# Patient Record
Sex: MALE | Race: WHITE | NOT HISPANIC OR LATINO | URBAN - NONMETROPOLITAN AREA
[De-identification: names, ages, dates, MRNs, and addresses within clinical notes are randomized per-mention and may not be internally consistent; named-entity substitution may affect disease eponyms.]

---
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
## 2025-02-10 DIAGNOSIS — F40.10 SOCIAL PHOBIA: ICD-10-CM

## 2025-02-10 RX ORDER — FLUOXETINE HYDROCHLORIDE 40 MG/1
40 CAPSULE ORAL DAILY
Qty: 90 CAPSULE | Refills: 0 | Status: SHIPPED | OUTPATIENT
Start: 2025-02-10 | End: 2026-02-10

## 2025-02-20 DIAGNOSIS — F90.2 ADHD (ATTENTION DEFICIT HYPERACTIVITY DISORDER), COMBINED TYPE: ICD-10-CM

## 2025-02-20 RX ORDER — METHYLPHENIDATE HYDROCHLORIDE 36 MG/1
36 TABLET ORAL EVERY MORNING
Qty: 30 TABLET | Refills: 0 | Status: SHIPPED | OUTPATIENT
Start: 2025-02-20 | End: 2025-03-22

## 2025-03-25 ENCOUNTER — APPOINTMENT (OUTPATIENT)
Dept: PRIMARY CARE | Facility: CLINIC | Age: 20
End: 2025-03-25
Payer: COMMERCIAL

## 2025-03-25 VITALS
WEIGHT: 181 LBS | HEART RATE: 99 BPM | OXYGEN SATURATION: 98 % | DIASTOLIC BLOOD PRESSURE: 70 MMHG | HEIGHT: 67 IN | BODY MASS INDEX: 28.41 KG/M2 | SYSTOLIC BLOOD PRESSURE: 104 MMHG

## 2025-03-25 DIAGNOSIS — F90.2 ADHD (ATTENTION DEFICIT HYPERACTIVITY DISORDER), COMBINED TYPE: ICD-10-CM

## 2025-03-25 DIAGNOSIS — Z79.899 ENCOUNTER FOR LONG-TERM (CURRENT) USE OF HIGH-RISK MEDICATION: Primary | ICD-10-CM

## 2025-03-25 PROCEDURE — 1036F TOBACCO NON-USER: CPT | Performed by: FAMILY MEDICINE

## 2025-03-25 PROCEDURE — 99213 OFFICE O/P EST LOW 20 MIN: CPT | Performed by: FAMILY MEDICINE

## 2025-03-25 PROCEDURE — 3008F BODY MASS INDEX DOCD: CPT | Performed by: FAMILY MEDICINE

## 2025-03-25 RX ORDER — METHYLPHENIDATE HYDROCHLORIDE 36 MG/1
36 TABLET ORAL EVERY MORNING
Qty: 30 TABLET | Refills: 0 | Status: SHIPPED | OUTPATIENT
Start: 2025-03-25 | End: 2025-04-24

## 2025-03-25 NOTE — PROGRESS NOTES
"Subjective   Patient ID: Marco A Ba is a 19 y.o. male who presents for ADHD follow up     HPI     LOV  9 /2024     Here with Dad today       ADD/ADHD follow up:   Dx:  ADHD   in  2nd  - 3 rd grade       Medication prescribed:      Currently on   Methylphenidate ER 36 mg daily     How well is it working?  Working well - lasts most of the day   (Per Dad and pt)            How is school/work going?   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     Doing well   Growth is fine     How is sleep?     Sleeps well     Any signs of anxiety or heart palpitations?     NONE     Do you want to stay on this medication?  YES     Is the dosage satisfactory?       YES       Date controlled med contract completed:  3/21/24  This was reviewed today  Patient and Parent voiced understanding     Date of last UDS if needed:   done 3/2024 -   Appropriate   Will do today 3/25/2025      I have personally reviewed the OARRS report for this person. I find it to be appropriate.   I have considered the risk of abuse, dependence, addiction and diversion.  I believe that it is clinically appropriate for this person to be prescribed this medication for the documented diagnoses.   OARRS report was reviewed on any day a prescription is written for a controlled substance.          On fluoxetine 40 mg daily  too for depression  -   working well       (Privately -  Dad told me that after the last appt - pt had stopped meds -   Was doing terrible -  felt awful - was mean to everyone all the time -   Dad had a serious talk with him - and so did his uncle who has similar issues -  Pt went back on meds and has been much better ever since)       Review of Systems    Objective   /70   Pulse 99   Ht 1.702 m (5' 7\")   Wt 82.1 kg (181 lb)   SpO2 98%   BMI 28.35 kg/m²     Physical Exam  Vitals reviewed.   Constitutional:       General: He is not in acute distress.     Appearance: Normal appearance.      Comments: " Very quite  - simple answers    HENT:      Head: Normocephalic and atraumatic.      Nose: Nose normal.      Mouth/Throat:      Mouth: Mucous membranes are moist.      Pharynx: No posterior oropharyngeal erythema.   Eyes:      Extraocular Movements: Extraocular movements intact.      Conjunctiva/sclera: Conjunctivae normal.      Pupils: Pupils are equal, round, and reactive to light.   Cardiovascular:      Rate and Rhythm: Normal rate and regular rhythm.      Heart sounds: Normal heart sounds. No murmur heard.  Pulmonary:      Effort: Pulmonary effort is normal. No respiratory distress.      Breath sounds: Normal breath sounds. No wheezing.   Musculoskeletal:      Cervical back: No rigidity.   Lymphadenopathy:      Cervical: No cervical adenopathy.   Skin:     General: Skin is warm and dry.      Findings: No rash.   Neurological:      General: No focal deficit present.      Mental Status: He is alert. Mental status is at baseline.         Assessment/Plan   Problem List Items Addressed This Visit             ICD-10-CM       Medium    ADHD (attention deficit hyperactivity disorder), combined type F90.2    Relevant Medications    methylphenidate ER (Concerta) 36 mg extended release tablet     Other Visit Diagnoses         Codes    Encounter for long-term (current) use of high-risk medication    -  Primary Z79.899    Relevant Orders    Opiate/Opioid/Benzo Prescription Compliance    Amphetamine Confirm, Urine            Pt doing much better- no change in meds   Tolerating well too       Has to be seen in 6 mos     Controlled med education given       We discussed at visit any disease processes that were of concern as well as the risks, benefits and instructions of any new medication provided.    See orders and discussion section for information handed to patient on their Clinical Summary.   Patient (and/or caretaker of patient if present)  stated all questions were answered, and they voiced understanding of instructions.

## 2025-03-25 NOTE — PATIENT INSTRUCTIONS
You have been given  prescription(s) for a controlled medication.   This/these medications  can be dangerous if not taken exactly as directed.    You have filled out a controlled medication contract, and it is very important to abide by the contract in order for me to continue to prescribe these medications for you.  You must take these medications as directed, you cannot let anyone else take any of these medications.  You have to keep them in a safe place to protect other people or animals from getting into them.     I cannot refill them if you request a refill earlier than expected.   You cannot get refills for these medications on weekends or after office hours.   You must make sure you have a visit with me every 6  months in order for me to continue to prescribe this/these medications.   Its important you are sure to ask me if you have any questions about our policies on these medications or the medications themselves.    Please bring your bottles of any remaining medications with you to any appointment where you will be needing refills of these medications.     You can be called at any time to come in for a drug screen and/or a pill count if we feel it is necessary.   If we do call you for this, you would need to come into the office within 24 hours of our call.

## 2025-03-29 LAB
1OH-MIDAZOLAM UR-MCNC: NEGATIVE NG/ML
7AMINOCLONAZEPAM UR-MCNC: NEGATIVE NG/ML
A-OH ALPRAZ UR-MCNC: NEGATIVE NG/ML
A-OH-TRIAZOLAM UR-MCNC: NEGATIVE NG/ML
AMPHET UR-MCNC: NEGATIVE NG/ML
AMPHETAMINES UR QL: NEGATIVE NG/ML
BARBITURATES UR QL: NEGATIVE NG/ML
BZE UR QL: NEGATIVE NG/ML
CODEINE UR-MCNC: NEGATIVE NG/ML
CREAT UR-MCNC: 163.1 MG/DL
DRUG SCREEN COMMENT UR-IMP: NORMAL
EDDP UR-MCNC: NEGATIVE NG/ML
FENTANYL UR-MCNC: NEGATIVE NG/ML
HYDROCODONE UR-MCNC: NEGATIVE NG/ML
HYDROMORPHONE UR-MCNC: NEGATIVE NG/ML
LORAZEPAM UR-MCNC: NEGATIVE NG/ML
MDA UR-MCNC: NEGATIVE NG/ML
MDEA UR-MCNC: NEGATIVE NG/ML
MDMA UR-MCNC: NEGATIVE NG/ML
METHADONE UR-MCNC: NEGATIVE NG/ML
METHAMPHET UR-MCNC: NEGATIVE NG/ML
MORPHINE UR-MCNC: NEGATIVE NG/ML
NORDIAZEPAM UR-MCNC: NEGATIVE NG/ML
NORFENTANYL UR-MCNC: NEGATIVE NG/ML
NORHYDROCODONE UR CFM-MCNC: NEGATIVE NG/ML
NOROXYCODONE UR CFM-MCNC: NEGATIVE NG/ML
NORTRAMADOL UR-MCNC: NEGATIVE NG/ML
OH-ETHYLFLURAZ UR-MCNC: NEGATIVE NG/ML
OXAZEPAM UR-MCNC: NEGATIVE NG/ML
OXIDANTS UR QL: NEGATIVE MCG/ML
OXYCODONE UR CFM-MCNC: NEGATIVE NG/ML
OXYMORPHONE UR CFM-MCNC: NEGATIVE NG/ML
PCP UR QL: NEGATIVE NG/ML
PH UR: 7.3 [PH] (ref 4.5–9)
PHENTERMINE UR-MCNC: NEGATIVE NG/ML
QUEST 6 ACETYLMORPHINE: NEGATIVE NG/ML
QUEST NOTES AND COMMENTS: NORMAL
QUEST ZOLPIDEM: NEGATIVE NG/ML
TEMAZEPAM UR-MCNC: NEGATIVE NG/ML
THC UR QL: NEGATIVE NG/ML
TRAMADOL UR-MCNC: NEGATIVE NG/ML
ZOLPIDEM PHENYL-4-CARB UR CFM-MCNC: NEGATIVE NG/ML

## 2025-04-22 ENCOUNTER — TELEPHONE (OUTPATIENT)
Dept: PRIMARY CARE | Facility: CLINIC | Age: 20
End: 2025-04-22
Payer: COMMERCIAL

## 2025-04-22 DIAGNOSIS — F90.2 ADHD (ATTENTION DEFICIT HYPERACTIVITY DISORDER), COMBINED TYPE: ICD-10-CM

## 2025-04-22 RX ORDER — METHYLPHENIDATE HYDROCHLORIDE 36 MG/1
36 TABLET ORAL EVERY MORNING
Qty: 30 TABLET | Refills: 0 | Status: SHIPPED | OUTPATIENT
Start: 2025-04-22 | End: 2025-05-22

## 2025-04-22 NOTE — TELEPHONE ENCOUNTER
I have personally reviewed the OARRS report for this person. I find it to be appropriate.   I have considered the risk of abuse, dependence, addiction and diversion.  I believe that it is clinically appropriate for this person to be prescribed this medication for the documented diagnoses.   OARRS report was reviewed on any day a prescription is written for a controlled substance.       Done

## 2025-05-06 DIAGNOSIS — F40.10 SOCIAL PHOBIA: ICD-10-CM

## 2025-05-06 RX ORDER — FLUOXETINE HYDROCHLORIDE 40 MG/1
40 CAPSULE ORAL DAILY
Qty: 120 CAPSULE | Refills: 0 | Status: SHIPPED | OUTPATIENT
Start: 2025-05-06

## 2025-05-20 DIAGNOSIS — F90.2 ADHD (ATTENTION DEFICIT HYPERACTIVITY DISORDER), COMBINED TYPE: ICD-10-CM

## 2025-05-20 RX ORDER — METHYLPHENIDATE HYDROCHLORIDE 36 MG/1
36 TABLET ORAL EVERY MORNING
Qty: 30 TABLET | Refills: 0 | Status: SHIPPED | OUTPATIENT
Start: 2025-05-20 | End: 2025-06-19

## 2025-06-24 DIAGNOSIS — F90.2 ADHD (ATTENTION DEFICIT HYPERACTIVITY DISORDER), COMBINED TYPE: ICD-10-CM

## 2025-06-25 RX ORDER — METHYLPHENIDATE HYDROCHLORIDE 36 MG/1
36 TABLET ORAL EVERY MORNING
Qty: 30 TABLET | Refills: 0 | Status: SHIPPED | OUTPATIENT
Start: 2025-06-25 | End: 2025-06-26 | Stop reason: SDUPTHER

## 2025-06-26 DIAGNOSIS — F90.2 ADHD (ATTENTION DEFICIT HYPERACTIVITY DISORDER), COMBINED TYPE: ICD-10-CM

## 2025-06-27 RX ORDER — METHYLPHENIDATE HYDROCHLORIDE 36 MG/1
36 TABLET ORAL EVERY MORNING
Qty: 30 TABLET | Refills: 0 | Status: SHIPPED | OUTPATIENT
Start: 2025-06-27 | End: 2025-07-27

## 2025-07-23 DIAGNOSIS — F90.2 ADHD (ATTENTION DEFICIT HYPERACTIVITY DISORDER), COMBINED TYPE: ICD-10-CM

## 2025-07-23 RX ORDER — METHYLPHENIDATE HYDROCHLORIDE 36 MG/1
36 TABLET ORAL EVERY MORNING
Qty: 30 TABLET | Refills: 0 | Status: SHIPPED | OUTPATIENT
Start: 2025-07-23 | End: 2025-08-22

## 2025-08-21 DIAGNOSIS — F90.2 ADHD (ATTENTION DEFICIT HYPERACTIVITY DISORDER), COMBINED TYPE: ICD-10-CM

## 2025-08-22 RX ORDER — METHYLPHENIDATE HYDROCHLORIDE 36 MG/1
36 TABLET ORAL EVERY MORNING
Qty: 30 TABLET | Refills: 0 | Status: SHIPPED | OUTPATIENT
Start: 2025-08-22 | End: 2025-09-21

## 2025-09-18 ENCOUNTER — APPOINTMENT (OUTPATIENT)
Dept: PRIMARY CARE | Facility: CLINIC | Age: 20
End: 2025-09-18
Payer: COMMERCIAL

## 2025-09-25 ENCOUNTER — APPOINTMENT (OUTPATIENT)
Dept: PRIMARY CARE | Facility: CLINIC | Age: 20
End: 2025-09-25
Payer: COMMERCIAL